# Patient Record
Sex: FEMALE | Race: BLACK OR AFRICAN AMERICAN | NOT HISPANIC OR LATINO | Employment: FULL TIME | URBAN - METROPOLITAN AREA
[De-identification: names, ages, dates, MRNs, and addresses within clinical notes are randomized per-mention and may not be internally consistent; named-entity substitution may affect disease eponyms.]

---

## 2023-11-29 ENCOUNTER — OFFICE VISIT (OUTPATIENT)
Dept: DERMATOLOGY | Facility: CLINIC | Age: 28
End: 2023-11-29
Payer: COMMERCIAL

## 2023-11-29 DIAGNOSIS — L73.2 HIDRADENITIS SUPPURATIVA: Primary | ICD-10-CM

## 2023-11-29 PROCEDURE — 99204 OFFICE O/P NEW MOD 45 MIN: CPT | Performed by: NURSE PRACTITIONER

## 2023-11-29 ASSESSMENT — ITCH NUMERIC RATING SCALE: HOW SEVERE IS YOUR ITCHING?: 0

## 2023-11-29 NOTE — PROGRESS NOTES
Subjective     Shellie Fish is a 28 y.o. female who presents for the following: PASH Syndrome.     New patient visit patient in for second opinion in regards to her diagnosis of PASH syndrome.  Patient states in April she seen a general surgeon for treatment to her right flank patient states after 2 more surgeries by 2 different general surgeons at [facility] they determined she had a PG.  Patient states that she then was seen by [facility] dermatology who she is still currently follows with they started her on Remicade infusions in June 2023 patient states that she went to [place] in July 2023 and suffered complications due to the weather that she is still adjusting to.  Patient states complications were with the surgical site on the right flank turning into an abscess.  Patient feels Remicade has been beneficial for her multiple ulcers she describes all over her body.      Review of Systems:  No other skin or systemic complaints other than what is documented elsewhere in the note.    The following portions of the chart were reviewed this encounter and updated as appropriate:       Skin Cancer History  No skin cancer on file.    Specialty Problems    None    Past Medical History:  Shellie Fish  has no past medical history on file.    Past Surgical History:  Shellie Fish  has no past surgical history on file.    Family History:  Patient family history is not on file.    Social History:  Shellie Fish  has no history on file for tobacco use, alcohol use, and drug use.    Allergies:  Patient has no known allergies.    Current Medications / CAM's:  No current outpatient medications on file.     Objective   Well appearing patient in no apparent distress; mood and affect are within normal limits.    Assessment/Plan   1. Hidradenitis suppurativa   Recurring tender, erythematous nodules, pustules, and abscesses that occur weeks or months apart. Disease progression with formation of tunnels, scars, and  "chronic purulent drainage can occur.      Patient has severe Strong stage III disease present to bilateral inframammary folds, bilateral axilla entire lower abdomen and groin present since adolescence.  She is being aggressively managed by her primary dermatologist in [facility] with a current regimen of oral doxycycline, dapsone, spironolactone, Remicade infusions every 8 weeks and occasionally topical clindamycin and benzyl peroxide.  She was also started on metformin 1500 mg daily however she had to reduce to 500 mg daily as she could not tolerate the GI side effects. she states she believes she has also been prescribed rifampin oral antibiotics.  She states Remicade infusions are extremely helpful however disease control only happens for approximately 4 weeks and patient flares extensively for an additional 4 weeks prior to next infusion.  She has tried and failed a multitude of treatments in the past including multiple surgical interventions with the most recent one yielding an pyoderma gangrenosum.  She states she was hospitalized many times primarily for pain control as that was resolving.  There is evidence of a large scar from her right flank extending under her right breast.    Hidradenitis suppurativa (HS), or acne inversa, is a chronic destructive inflammatory disorder of the hair follicles. While the pathogenesis is unclear, it is thought that follicular rupture initiates interaction between the follicular microbiome and innate immune system, triggering an inflammatory response.    HS is seen most commonly on the buttocks, breasts, groin, and axillae. Usually, the onset of the disease occurs soon after puberty, and patients typically report recurring \"boils.\" Pain is consistently reported as the most bothersome symptom, but itching, drainage, odor, fatigue, and arthralgias are frequent additional concerns.    Mechanical irritation such as by friction from tight clothing or shaving is often reported as " a trigger. For many women, the week before menses can trigger disease flares, and pregnancy and the postpartum period can be associated with either disease improvement or flaring.    Obesity and cigarette smoking are associated with HS severity. Prevalence of metabolic syndrome, major cardiovascular events, cardiac death, and diabetes (type 1 or type 2) are increased in HS compared to the general populations. Some of the most frequently associated comorbidities are related to mental health, and depression, anxiety, and risk of suicide are major burdens for the population.     A positive family history is reported in 30%-50% of patients.     PLAN:  We had a very long discussion regarding potential treatment options as I do believe that her primary dermatologist is managing well.  She is primarily presenting for alternative options and treatment.    We discussed an alternate possibility of changing from Remicade to to IL-17 secukinumab as it is recent FDA approval for hidradenitis suppurativa.     Weight loss was discussed in addition as patient is severely morbidly obese.  Improvement in disease severity based on improvement in body mass index    Surgical intervention for recalcitrant lesions not encouraged given history of pyoderma gangrenosum

## 2024-01-30 ENCOUNTER — APPOINTMENT (OUTPATIENT)
Dept: DERMATOLOGY | Facility: CLINIC | Age: 29
End: 2024-01-30
Payer: COMMERCIAL